# Patient Record
Sex: MALE | Race: WHITE
[De-identification: names, ages, dates, MRNs, and addresses within clinical notes are randomized per-mention and may not be internally consistent; named-entity substitution may affect disease eponyms.]

---

## 2020-06-22 ENCOUNTER — HOSPITAL ENCOUNTER (OUTPATIENT)
Dept: HOSPITAL 95 - ORSCSDS | Age: 80
Discharge: HOME | End: 2020-06-22
Attending: INTERNAL MEDICINE
Payer: OTHER GOVERNMENT

## 2020-06-22 VITALS — WEIGHT: 199.3 LBS | BODY MASS INDEX: 30.2 KG/M2 | HEIGHT: 67.99 IN

## 2020-06-22 DIAGNOSIS — Z79.84: ICD-10-CM

## 2020-06-22 DIAGNOSIS — K31.7: ICD-10-CM

## 2020-06-22 DIAGNOSIS — Z79.82: ICD-10-CM

## 2020-06-22 DIAGNOSIS — K44.9: ICD-10-CM

## 2020-06-22 DIAGNOSIS — Z79.899: ICD-10-CM

## 2020-06-22 DIAGNOSIS — K21.9: ICD-10-CM

## 2020-06-22 DIAGNOSIS — I25.10: ICD-10-CM

## 2020-06-22 DIAGNOSIS — D12.5: ICD-10-CM

## 2020-06-22 DIAGNOSIS — K64.2: ICD-10-CM

## 2020-06-22 DIAGNOSIS — D12.3: ICD-10-CM

## 2020-06-22 DIAGNOSIS — Z87.891: ICD-10-CM

## 2020-06-22 DIAGNOSIS — E11.9: ICD-10-CM

## 2020-06-22 DIAGNOSIS — D12.2: ICD-10-CM

## 2020-06-22 DIAGNOSIS — R19.8: Primary | ICD-10-CM

## 2020-06-22 DIAGNOSIS — I10: ICD-10-CM

## 2020-06-22 PROCEDURE — 0DB78ZX EXCISION OF STOMACH, PYLORUS, VIA NATURAL OR ARTIFICIAL OPENING ENDOSCOPIC, DIAGNOSTIC: ICD-10-PCS | Performed by: INTERNAL MEDICINE

## 2020-06-22 PROCEDURE — 0DBK8ZX EXCISION OF ASCENDING COLON, VIA NATURAL OR ARTIFICIAL OPENING ENDOSCOPIC, DIAGNOSTIC: ICD-10-PCS | Performed by: INTERNAL MEDICINE

## 2020-06-22 PROCEDURE — 0DBL8ZX EXCISION OF TRANSVERSE COLON, VIA NATURAL OR ARTIFICIAL OPENING ENDOSCOPIC, DIAGNOSTIC: ICD-10-PCS | Performed by: INTERNAL MEDICINE

## 2020-06-22 PROCEDURE — 0DBN8ZX EXCISION OF SIGMOID COLON, VIA NATURAL OR ARTIFICIAL OPENING ENDOSCOPIC, DIAGNOSTIC: ICD-10-PCS | Performed by: INTERNAL MEDICINE

## 2022-11-17 ENCOUNTER — HOSPITAL ENCOUNTER (OUTPATIENT)
Dept: HOSPITAL 95 - ORSCSDS | Age: 82
Discharge: HOME | End: 2022-11-17
Attending: INTERNAL MEDICINE
Payer: OTHER GOVERNMENT

## 2022-11-17 VITALS — WEIGHT: 193.79 LBS | HEIGHT: 68 IN | BODY MASS INDEX: 29.37 KG/M2

## 2022-11-17 DIAGNOSIS — I25.10: ICD-10-CM

## 2022-11-17 DIAGNOSIS — Z79.899: ICD-10-CM

## 2022-11-17 DIAGNOSIS — R10.13: ICD-10-CM

## 2022-11-17 DIAGNOSIS — D12.0: ICD-10-CM

## 2022-11-17 DIAGNOSIS — E11.40: ICD-10-CM

## 2022-11-17 DIAGNOSIS — Z12.11: Primary | ICD-10-CM

## 2022-11-17 DIAGNOSIS — Z87.891: ICD-10-CM

## 2022-11-17 DIAGNOSIS — D12.3: ICD-10-CM

## 2022-11-17 DIAGNOSIS — R13.14: ICD-10-CM

## 2022-11-17 DIAGNOSIS — D12.4: ICD-10-CM

## 2022-11-17 DIAGNOSIS — K64.8: ICD-10-CM

## 2022-11-17 DIAGNOSIS — K31.7: ICD-10-CM

## 2022-11-17 DIAGNOSIS — K21.9: ICD-10-CM

## 2022-11-17 DIAGNOSIS — Z86.010: ICD-10-CM

## 2022-11-17 PROCEDURE — 0DB78ZX EXCISION OF STOMACH, PYLORUS, VIA NATURAL OR ARTIFICIAL OPENING ENDOSCOPIC, DIAGNOSTIC: ICD-10-PCS | Performed by: INTERNAL MEDICINE

## 2022-11-17 PROCEDURE — 0DBH8ZX EXCISION OF CECUM, VIA NATURAL OR ARTIFICIAL OPENING ENDOSCOPIC, DIAGNOSTIC: ICD-10-PCS | Performed by: INTERNAL MEDICINE

## 2022-11-17 PROCEDURE — 0DBL8ZX EXCISION OF TRANSVERSE COLON, VIA NATURAL OR ARTIFICIAL OPENING ENDOSCOPIC, DIAGNOSTIC: ICD-10-PCS | Performed by: INTERNAL MEDICINE

## 2022-11-17 PROCEDURE — 0DBM8ZX EXCISION OF DESCENDING COLON, VIA NATURAL OR ARTIFICIAL OPENING ENDOSCOPIC, DIAGNOSTIC: ICD-10-PCS | Performed by: INTERNAL MEDICINE

## 2022-11-17 NOTE — NUR
11/17/22 1328 Lorrie Alvarado
TWO ATTEMPTS AT IV BY MA. FIRST ATTEMPT IN L HAND UNABLE TO ENTER
CATHERTER INTO VEIN. SECOND ATTEMPT IN R AC SUCESSFUL.